# Patient Record
Sex: FEMALE | Employment: FULL TIME | URBAN - METROPOLITAN AREA
[De-identification: names, ages, dates, MRNs, and addresses within clinical notes are randomized per-mention and may not be internally consistent; named-entity substitution may affect disease eponyms.]

---

## 2017-01-19 ENCOUNTER — TELEPHONE (OUTPATIENT)
Dept: NEUROLOGY | Facility: CLINIC | Age: 27
End: 2017-01-19

## 2017-01-26 ENCOUNTER — OFFICE VISIT (OUTPATIENT)
Dept: NEUROLOGY | Facility: CLINIC | Age: 27
End: 2017-01-26

## 2017-01-26 VITALS
DIASTOLIC BLOOD PRESSURE: 70 MMHG | WEIGHT: 115 LBS | HEART RATE: 88 BPM | SYSTOLIC BLOOD PRESSURE: 100 MMHG | RESPIRATION RATE: 18 BRPM | BODY MASS INDEX: 22 KG/M2

## 2017-01-26 DIAGNOSIS — G40.909 SEIZURE DISORDER (HCC): Primary | ICD-10-CM

## 2017-01-26 DIAGNOSIS — F41.9 ANXIETY: ICD-10-CM

## 2017-01-26 DIAGNOSIS — G40.309 NONINTRACTABLE GENERALIZED IDIOPATHIC EPILEPSY WITHOUT STATUS EPILEPTICUS (HCC): ICD-10-CM

## 2017-01-26 PROCEDURE — 99204 OFFICE O/P NEW MOD 45 MIN: CPT | Performed by: OTHER

## 2017-01-26 RX ORDER — LAMOTRIGINE 25 MG/1
TABLET ORAL
Qty: 120 TABLET | Refills: 3 | Status: SHIPPED | OUTPATIENT
Start: 2017-01-26 | End: 2019-08-02 | Stop reason: ALTCHOICE

## 2017-01-26 NOTE — PATIENT INSTRUCTIONS
Refill policies:    • Allow 2 business days for refills; controlled substances may take longer.   • Contact your pharmacy at least 5 days prior to running out of medication and have them send an electronic request or submit request through the “request re as instructed in script. Tonja Ackerman      Precertification and Prior Authorizations  If your physician has recommended that you have a procedure or additional testing performed.   [employer] ([facility]) will contact your insurance carrier t chocolate) for at least 4 hours prior to the test.    Please call [phone] to schedule your test.

## 2017-01-26 NOTE — PROGRESS NOTES
Jimmy [address]   Neurology; INITIAL CLINIC VISIT  2017, 8:57 AM     Ben Springer Patient Status:  No patient class for patient encounter    1990 MRN [id number]   Location [address] [place] [address] Kendall Perkins mother. No seizure in her family,     SOCIAL HISTORY:   reports that she has never smoked. She has never used smokeless tobacco. She reports that she drinks alcohol. She reports that she does not use illicit drugs.     ALLERGIES:  No Known Allergies    MED expression and comprehension, no dysarthria, voice is normal in volume, follow commends well;  Memory and comprehension:  MMSE is normal,   Cranial Nerves       CN II:  Visual fields full, Pupils equal and reactive, Fundi normal       CN III, IV, VI:  Horr Return in about 4 weeks (around 2/23/2017). We discussed in depth regarding diagnosis, prognosis, treatment. The patient and caregiver were given ample opportunity to ask questions. All questions and concerns were addressed.      Chip Priestly, MD Karyle Aye

## 2017-01-27 ENCOUNTER — TELEPHONE (OUTPATIENT)
Dept: NEUROLOGY | Facility: CLINIC | Age: 27
End: 2017-01-27

## 2017-01-27 NOTE — TELEPHONE ENCOUNTER
Left message at home number, MRI Brain ordered by Dr. Maricarmen Mcclain has been approved by your insurance, authorization #[id number]  and is approved through [id number]. Please have procedure completed before 3.13.17.  Schedule at your convenience with the ce

## 2017-02-02 ENCOUNTER — HOSPITAL ENCOUNTER (OUTPATIENT)
Dept: MRI IMAGING | Age: 27
Discharge: HOME | End: 2017-02-02
Attending: Other
Payer: COMMERCIAL

## 2017-02-02 DIAGNOSIS — G40.909 SEIZURE DISORDER (HCC): ICD-10-CM

## 2017-02-02 PROCEDURE — A9575 INJ GADOTERATE MEGLUMI 0.1ML: HCPCS | Performed by: OTHER

## 2017-02-02 PROCEDURE — 70553 MRI BRAIN STEM W/O & W/DYE: CPT

## 2017-02-09 ENCOUNTER — NURSE ONLY (OUTPATIENT)
Dept: NEUROLOGY | Facility: CLINIC | Age: 27
End: 2017-02-09

## 2017-02-09 DIAGNOSIS — G40.909 SEIZURE DISORDER (HCC): ICD-10-CM

## 2017-02-09 PROCEDURE — 95819 EEG AWAKE AND ASLEEP: CPT | Performed by: OTHER

## 2017-02-09 NOTE — PROCEDURES
Electroencephalography      Reason for the test:    DX: SEIZURE D/O  HX: PT IS A 27 Y/O FEMALE WHO PRESENTS WITH SEIZURE D/O.

## 2017-02-24 ENCOUNTER — OFFICE VISIT (OUTPATIENT)
Dept: NEUROLOGY | Facility: CLINIC | Age: 27
End: 2017-02-24

## 2017-02-24 VITALS
SYSTOLIC BLOOD PRESSURE: 98 MMHG | DIASTOLIC BLOOD PRESSURE: 62 MMHG | HEART RATE: 74 BPM | RESPIRATION RATE: 18 BRPM | BODY MASS INDEX: 23 KG/M2 | WEIGHT: 117 LBS

## 2017-02-24 DIAGNOSIS — G40.909 SEIZURE DISORDER (HCC): Primary | ICD-10-CM

## 2017-02-24 DIAGNOSIS — G40.409 OTHER GENERALIZED EPILEPSY, NOT INTRACTABLE, WITHOUT STATUS EPILEPTICUS (HCC): ICD-10-CM

## 2017-02-24 DIAGNOSIS — F41.9 ANXIETY: ICD-10-CM

## 2017-02-24 PROCEDURE — 99214 OFFICE O/P EST MOD 30 MIN: CPT | Performed by: OTHER

## 2017-02-24 PROCEDURE — 95819 EEG AWAKE AND ASLEEP: CPT | Performed by: OTHER

## 2017-02-24 NOTE — PATIENT INSTRUCTIONS
Refill policies:    • Allow 2 business days for refills; controlled substances may take longer.   • Contact your pharmacy at least 5 days prior to running out of medication and have them send an electronic request or submit request through the “request re your physician has recommended that you have a procedure or additional testing performed. [facility] will contact your insurance carrier to obtain pre-certification or prior authorization.     Unfortunately, [facility] has seen an increas

## 2017-02-24 NOTE — PROCEDURES
Electroencephalography      Reason for the test:  This is patient presented grand  mal seizure tiwce, in 2009, then December 2

## 2017-02-24 NOTE — PROGRESS NOTES
Jimmy [address]27   Neurology; follow up  CLINIC VISIT  2017    Gi Stock Patient Status:  No patient class for patient encounter    1990 MRN [id number]   Location [address] [place] [address], [address] [place] [address] PCP N Scalp       FAMILY HISTORY:  family history includes Diabetes in her maternal grandmother and paternal grandfather; Hypertension in her paternal grandfather; Lipids in her mother.   No seizure in her family,     SOCIAL HISTORY:   reports that she has never lesions    Neurologic Examination:  Mental status: he is awake, alert, oriented to time, name and place, Mentally appropriate  for age;  Language and speech: language is intact in expression and comprehension, no dysarthria, voice is normal in volume, foll tolerated, may up to 100 mg bid for seizure prevention. It may help her underlying anxiety, side effect was discussed,     Return in about 3 months (around 5/24/2017).   May adjust dose if needed,   We discussed in depth regarding diagnosis, prognosis, miller

## 2019-08-02 ENCOUNTER — OFFICE VISIT (OUTPATIENT)
Dept: INTERNAL MEDICINE CLINIC | Facility: CLINIC | Age: 29
End: 2019-08-02
Payer: COMMERCIAL

## 2019-08-02 VITALS
RESPIRATION RATE: 16 BRPM | TEMPERATURE: 99 F | BODY MASS INDEX: 23.03 KG/M2 | HEIGHT: 61 IN | SYSTOLIC BLOOD PRESSURE: 112 MMHG | DIASTOLIC BLOOD PRESSURE: 70 MMHG | HEART RATE: 104 BPM | WEIGHT: 122 LBS

## 2019-08-02 DIAGNOSIS — G40.409 OTHER GENERALIZED EPILEPSY, NOT INTRACTABLE, WITHOUT STATUS EPILEPTICUS (HCC): ICD-10-CM

## 2019-08-02 DIAGNOSIS — R56.9 SEIZURE (HCC): Primary | ICD-10-CM

## 2019-08-02 DIAGNOSIS — R68.84 JAW PAIN: ICD-10-CM

## 2019-08-02 PROCEDURE — 99204 OFFICE O/P NEW MOD 45 MIN: CPT | Performed by: PHYSICIAN ASSISTANT

## 2019-08-02 RX ORDER — CYCLOBENZAPRINE HCL 5 MG
5 TABLET ORAL 3 TIMES DAILY PRN
Qty: 12 TABLET | Refills: 0 | Status: SHIPPED | OUTPATIENT
Start: 2019-08-02 | End: 2021-01-25

## 2019-08-02 RX ORDER — LAMOTRIGINE 25 MG/1
TABLET ORAL
Qty: 120 TABLET | Refills: 0 | Status: SHIPPED | OUTPATIENT
Start: 2019-08-02 | End: 2020-04-13

## 2019-08-02 NOTE — PROGRESS NOTES
HPI:    Patient ID: Kelle Oliva is a 29year old female.   Patient presents with:  Establish Care: NP; Rm 4  Seizures: would like a refrral for a neurologist  Pain: c/o mouth and lip pain from seizure injury     Patient states she was grocery shopping wi Negative for back pain, joint swelling, joint pain, gait problem, neck pain and neck stiffness. Skin: Negative. Negative for wound. Neurological: Positive for seizures. Negative for dizziness, vertigo, tremors, weakness, numbness and headaches.    All No erythema. No pallor. Psychiatric: She has a normal mood and affect. /70   Pulse 104   Temp 98.5 °F (36.9 °C) (Oral)   Resp 16   Ht 61\"   Wt 122 lb   LMP 07/29/2019 (Exact Date)   Breastfeeding?  No   BMI 23.05 kg/m²           ASSESSMENT/PL

## 2019-08-02 NOTE — PATIENT INSTRUCTIONS
Establish with neurologist as soon as possible  Start lamictal as directed  Take flexeril if needed for pain. Continue ibuprofen.    We will call with x-ray results

## (undated) NOTE — MR AVS SNAPSHOT
[facility] [address]  [address] [place] [facility] [address] [phone]               Thank you for choosing us for your health care visit with [facility].   We are glad to serve you and happy to provide you with this summa

## (undated) NOTE — MR AVS SNAPSHOT
[address], [place] [phone]               Thank you for choosing us for your health care visit with Romelia Mak MD.  We are glad to serve you and happy to provide you with this summary family member will be picking up prescription, office must be given name of individual in advance and they must present an ID as well. ? The name of the person picking up your prescription must be documented in your chart.   Scheduling Tests    If your phy most of it will be cleaned out of your hair with a warm washcloth before you leave. If you like, you can bring a hat to wear after the test or put your hair in a ponytail.    · You will be asked to relax with your eyes closed for a majority of the test and [facility] [facility] [address] [phone], [address]  [facility] [address], [address]     Phone:  [phone]    - lamoTRIgine 25 MG Tabs            Today's Orders     EEG (At [facility])    Complete by:  Jan 26, 2017 (Yordan Don’t eat while distracted and slow down. Avoid over sized portions. Don’t eat while when you’re bored.      EAT THESE FOODS MORE OFTEN: EAT THESE FOODS LESS OFTEN:   Make half your plate fruits and vegetables Highly refined, white starches including wh

## (undated) NOTE — MR AVS SNAPSHOT
[address], [place] [address] [phone]               Thank you for choosing us for your health care visit with Kristina Gonzalez MD.  We are glad to serve you and happy to provide you with this summary ? Please allow the office 48-72 hours to fill the prescription. ? Patient must present photo ID at time of .   If a designated family member will be picking up prescription, office must be given name of individual in advance and they must present a One tablet bid for a week, then two tablets bid   Commonly known as:  LAMICTAL                   MyChart     Visit MyChart  You can access your MyChart to more actively manage your health care and view more details from this visit by going to [email or web address]